# Patient Record
(demographics unavailable — no encounter records)

---

## 2024-11-18 NOTE — HISTORY OF PRESENT ILLNESS
[de-identified] : Patient reports around cats - nasal congestion - recurrent sneezing and rash - some friends with cats - he has problems going to visit the houses.   He takes prn Benadryl with sedation.   He has not chest symptoms with cat exposure.   Patient also with spring time exacerbation of his allergies.   Age 12 - lobster - swelling fingers - pin and needles - rash on skin - ER - he has not had shellfish - this summer ate shrimp by accident - he ate many small pieces - induced vomiting and he took Benadryl - he was not feeling like he was having an allergic reaction.   He does not have an EpiPen.   No history of astham.

## 2024-11-18 NOTE — HISTORY OF PRESENT ILLNESS
[de-identified] : Patient reports around cats - nasal congestion - recurrent sneezing and rash - some friends with cats - he has problems going to visit the houses.   He takes prn Benadryl with sedation.   He has not chest symptoms with cat exposure.   Patient also with spring time exacerbation of his allergies.   Age 12 - lobster - swelling fingers - pin and needles - rash on skin - ER - he has not had shellfish - this summer ate shrimp by accident - he ate many small pieces - induced vomiting and he took Benadryl - he was not feeling like he was having an allergic reaction.   He does not have an EpiPen.   No history of astham.

## 2024-11-18 NOTE — PHYSICAL EXAM
[Alert] : alert [Well Nourished] : well nourished [No Acute Distress] : no acute distress [Well Developed] : well developed [No Neck Mass] : no neck mass was observed [No LAD] : no lymphadenopathy [Normal Rate and Effort] : normal respiratory rhythm and effort [No Crackles] : no crackles [No Retractions] : no retractions [Wheezing] : no wheezing was heard [Normal Rate] : heart rate was normal  [Normal S1, S2] : normal S1 and S2 [Regular Rhythm] : with a regular rhythm [Normal Cervical Lymph Nodes] : cervical [Skin Intact] : skin intact  [No Rash] : no rash [Normal Mood] : mood was normal [Judgment and Insight Age Appropriate] : judgement and insight is age appropriate

## 2024-11-18 NOTE — ASSESSMENT
[FreeTextEntry1] : Allergic rhinoconjunctivitis with cat exposure:  Patient will RV for environmental skin testing   Shellfish allergy by history:  ImmunoCAP to crab, shrimp, lobster, scallop, squid, clam, mussel Pending results of blood work he also may need select food skin testing and EpiPen

## 2024-11-18 NOTE — SOCIAL HISTORY
[House] : [unfilled] lives in a house  [Radiator/Baseboard] : heating provided by radiator(s)/baseboard(s) [Bedroom] :  in bedroom [Other___] : [unfilled] [Single] : single [FreeTextEntry1] : [facility]  Lives with parents  [Living Area] : not in the living area [Smokers in Household] : there are no smokers in the home [de-identified] : wall unit

## 2024-11-18 NOTE — SOCIAL HISTORY
[House] : [unfilled] lives in a house  [Radiator/Baseboard] : heating provided by radiator(s)/baseboard(s) [Bedroom] :  in bedroom [Other___] : [unfilled] [Single] : single [FreeTextEntry1] : [facility]  Lives with parents  [Living Area] : not in the living area [Smokers in Household] : there are no smokers in the home [de-identified] : wall unit